# Patient Record
Sex: MALE | Race: WHITE | NOT HISPANIC OR LATINO
[De-identification: names, ages, dates, MRNs, and addresses within clinical notes are randomized per-mention and may not be internally consistent; named-entity substitution may affect disease eponyms.]

---
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
## 2021-01-27 PROBLEM — R73.09 ELEVATED HEMOGLOBIN A1C: Status: RESOLVED | Noted: 2018-02-22 | Resolved: 2021-01-27

## 2021-01-27 PROBLEM — E84.8: Status: ACTIVE | Noted: 2019-02-13

## 2021-01-27 PROBLEM — Z86.39 HISTORY OF HYPERGLYCEMIA: Status: RESOLVED | Noted: 2018-02-22 | Resolved: 2021-01-27

## 2021-01-27 NOTE — END OF VISIT
[>50% of Time Spent on Counseling and Coordination of Care for  ___] : Greater than 50% of the encounter time was spent on counseling and coordination of care for [unfilled] [Time Spent: ___ minutes] : I have spent [unfilled] minutes of face to face time with the patient [FreeTextEntry2] : IShara have acted as a scribe and documented the HPI information for .  The HPI information completed by the scribe is an accurate record of both my words and actions.

## 2021-01-27 NOTE — REASON FOR VISIT
[Routine Follow-Up] : a routine follow-up visit for [Patient] : patient [FreeTextEntry3] : 4th quarter visit

## 2021-01-27 NOTE — PHYSICAL EXAM
[Well Nourished] : well nourished [Well Developed] : well developed [Well Groomed] : well groomed [Alert] : ~L alert [Active] : active [Normal Breathing Pattern] : normal breathing pattern [No Respiratory Distress] : no respiratory distress [No Allergic Shiners] : no allergic shiners [No Drainage] : no drainage [No Conjunctivitis] : no conjunctivitis [Tympanic Membranes Clear] : tympanic membranes were clear [Nasal Mucosa Non-Edematous] : nasal mucosa non-edematous [No Nasal Drainage] : no nasal drainage [No Sinus Tenderness] : no sinus tenderness [No Oral Pallor] : no oral pallor [No Oral Cyanosis] : no oral cyanosis [Non-Erythematous] : non-erythematous [No Exudates] : no exudates [No Postnasal Drip] : no postnasal drip [Tonsil Size ___] : tonsil size [unfilled] [No Tonsillar Enlargement] : no tonsillar enlargement [No Stridor] : no stridor [Absence Of Retractions] : absence of retractions [Symmetric] : symmetric [No Acc Muscle Use] : no accessory muscle use [Good aeration to bases] : good aeration to bases [Equal Breath Sounds] : equal breath sounds bilaterally [No Crackles] : no crackles [No Rhonchi] : no rhonchi [No Wheezing] : no wheezing [Normal Sinus Rhythm] : normal sinus rhythm [No Heart Murmur] : no heart murmur [Soft, Non-Tender] : soft, non-tender [No Hepatosplenomegaly] : no hepatosplenomegaly [Non Distended] : was not ~L distended [Abdomen Mass (___ Cm)] : no abdominal mass palpated [Full ROM] : full range of motion [No Cyanosis] : no cyanosis [No Petechiae] : no petechiae [No Kyphoscoliosis] : no kyphoscoliosis [No Contractures] : no contractures [Abnormal Walk] : normal gait [Alert and  Oriented] : alert and oriented [Normal Muscle Tone And Reflexes] : normal muscle tone and reflexes [No Birth Marks] : no birth marks [No Rashes] : no rashes [No Skin Lesions] : no skin lesions [FreeTextEntry1] : healthy appearing- dyed hair, tattoos  [de-identified] : early clubbing  [de-identified] :   tattoo on left flank, both thighs

## 2021-01-27 NOTE — HISTORY OF PRESENT ILLNESS
[] : vest daily [___] : vest is used for [unfilled] minutes [Poor] : poor [Normal] : normal [FreeTextEntry1] : 12/18/[birth year] 19 y/o male with CF here for routine f/u in office for 4th quarter visit, last seen 8/17/20 prior to going up to [employer] for Fall semester.\par Interval: unremarkable: started Trikafta this January 2020.\par \par Endo: Started insulin via adult Endo in May 2020, Lantus 20 units qhs and Humalog 13 units with meals. Excellent weight gain. Blood sugars range .  Never in the 200s as they were prior to starting insulin. 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 \par \par Pulm:  Ventolin with spacer/Pulmozyme using vest while home from college/ On alternate month AC podhaler on/off this is an off month. Denies cough even with treatments. PFTs are unavailable today.  Importance of airway clearance stressed with patient. Given Aerobika at last visit but not using at college. \par  Previously pt. was not sterilizing respiratory equipment at school- only cleaning. Despite lack of adherence to Vest, I encouraged him to bring to school in the event he becomes sick. He states he will use Aerobika over the Vest for now. \par He is home now and reports all of his respiratory equipment are in good working order. \par \par Endo:  Adult Endo in July 2019: for elevated 2 hour value on OGTT. Removed soda and concentrated sugar from diet. Liberalized his diet- no longer eliminating all carbohydrates. CGM recommended but patient deferred, plan was to have him test BS instead which he states he doesn't do and prefers to just watch what he eats as he was checking randomly and values were all less than 150.  Will follow up with endo q 6 months. \par \par GI: Denies abd pain  at this time. No issues with constipation or need for Miralax.  Stools are 1-2 x/day, Appetite is good- states he forces himself to eat even when he not hungry; weight today 148 pounds - he gained weight this visit. Continues with enzymes: Creon 24,000, 4 with meals, 3 with snacks. \par \par \par  [de-identified] : reports adherence of vest use  5/7 days week [de-identified] : Eusebio Paez [de-identified] : three meals, 3 snacks, no supplements

## 2021-01-27 NOTE — REVIEW OF SYSTEMS
[NI] : Allergic [Nl] : Endocrine [Wgt Gain (___ Kg)] : recent [unfilled] kg weight gain [___Stools per day] : [unfilled] stools per day [Immunizations are up to date] : Immunizations are up to date [Influenza Vaccine this Past Year] : Influenza vaccine this past year [Wgt Loss (___ Kg)] : no recent weight loss [Cough] : no cough [Abdominal Pain] : no abdominal pain [Oily Stool] : no oily stool [FreeTextEntry2] : very large appetite, eats a lot and often; sometimes forcing himself to eat [de-identified] : no polyuria, polydipsia  [FreeTextEntry1] : Flu vaccine for 2020-21 given at school.

## 2021-07-14 ENCOUNTER — RX RENEWAL (OUTPATIENT)
Age: 21
End: 2021-07-14

## 2021-07-23 ENCOUNTER — APPOINTMENT (OUTPATIENT)
Dept: ENDOCRINOLOGY | Facility: CLINIC | Age: 21
End: 2021-07-23
Payer: COMMERCIAL

## 2021-07-23 DIAGNOSIS — E84.9 CYSTIC FIBROSIS, UNSPECIFIED: ICD-10-CM

## 2021-07-23 DIAGNOSIS — E08.9 CYSTIC FIBROSIS, UNSPECIFIED: ICD-10-CM

## 2021-07-23 PROCEDURE — 99213 OFFICE O/P EST LOW 20 MIN: CPT | Mod: 95

## 2021-07-23 RX ORDER — PEN NEEDLE, DIABETIC 29 G X1/2"
32G X 4 MM NEEDLE, DISPOSABLE MISCELLANEOUS
Qty: 4 | Refills: 1 | Status: ACTIVE | COMMUNITY
Start: 2020-05-13 | End: 1900-01-01

## 2021-07-23 NOTE — PHYSICAL EXAM
[Well Nourished] : well nourished [No Respiratory Distress] : no respiratory distress [No Accessory Muscle Use] : no accessory muscle use [Normal Rate and Effort] : normal respiratory rate and effort [Oriented x3] : oriented to person, place, and time [Normal Affect] : the affect was normal [Normal Insight/Judgement] : insight and judgment were intact [de-identified] : not able to access [de-identified] : Unable to assess [de-identified] : Unable to assess [de-identified] : Unable to assess

## 2021-07-23 NOTE — HISTORY OF PRESENT ILLNESS
[FreeTextEntry1] : Mr. 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  \par \par He was initially seen there in Feb 2018 for evaluation due to concern for new onset diabetes. Michael follows with Dr. Bailey, who performs screening OGTTs to evaluate for CF related diabetes. Previously Michael had an elevated fasting glucose on 7/15/15 (125 mg/dL), but 2 hour glucose was normal at 109 mg/dL. Repeat OGT on 6/9/17 was normal. Michael's most recent testing was performed on 2/20/18 and results were concerning for diabetes (fasting 129 mg/dL, 2 hour 265 mg/dL). A1c was 6.2 %. He was then referred to yair arevalo for evaluation.  \par \par He changed his diet significantly, he cut out all concentrated sweets.  \par \par He is not interested in meeting with the nutritionist. He is not interested in CGMs as well currently.  \par \par Patient was started on insulin therapy around May 13, 2020.\par \par Patient is currently taking Basaglar 20 units at bedtime, Humalog 13 units 3 times daily with meals.  Reports that he had been adherent with his insulin regimen.  Reports that he has been monitoring glucose twice daily.  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  In terms of diet, usually chicken rice, quesadillas, burritos.  He cooks at home has his own kitchen, lives in an apartment.\par \par Patient is most likely relocating out of [place].\par \par \par

## 2021-07-23 NOTE — ASSESSMENT
[FreeTextEntry1] : Mr. CARINA HARRIS is a 21 year old male with cystic fibrosis here for evaluation of elevated 2 hour glucose tests. \par \par 1 CFRD\par Discussed that the primary abnormality predisposing to CFRD is slowly progressive insulin deficiency due to fibrosis and atrophy of pancreatic tissue. For now, no indication to start insulin therapy as she only has mild dysglycemia and there's no symptoms of hyperglycemia or CF exacerbation or poor nutritional status.  Patient is currently on basal bolus insulin regimen.  He is monitoring glucose twice daily although not suboptimally.  He reports his weight has been stable.  He is being treated with Trikafta, denies any major improvement in terms of pulmonary functions.  \par Recommend to continue with Basaglar 20 units at bedtime\par Recommend to continue with Humalog 13 units 3 times daily with meals.\par Patient was recommended to check glucose more frequently.\par He is not able to return to [place] until sometimes next year.\par I discussed with patient's that I need to obtain some laboratory results including A1c, microalbumin/creatinine ratio and thyroid function test.\par Will e-mail script to his e-mail\par He is going to see a new PMD at [place] next week and willl ask them to draw those labs. \par Will let me know regards to follow up plans. \par Asked patient to FU in 6 months.

## 2021-07-23 NOTE — REASON FOR VISIT
[Home] : at home, [unfilled] , at the time of the visit. [Medical Office: ([facility])___] : at the medical office located in  [Verbal consent obtained from patient] : the patient, [unfilled] [Follow - Up] : a follow-up visit

## 2021-07-29 RX ORDER — ELEXACAFTOR, TEZACAFTOR, AND IVACAFTOR 100-50-75
100-50-75 & 150 KIT ORAL
Qty: 1 | Refills: 2 | Status: ACTIVE | COMMUNITY
Start: 2019-12-18 | End: 1900-01-01

## 2021-08-12 ENCOUNTER — NON-APPOINTMENT (OUTPATIENT)
Age: 21
End: 2021-08-12

## 2022-07-26 ENCOUNTER — RX RENEWAL (OUTPATIENT)
Age: 22
End: 2022-07-26

## 2022-10-13 NOTE — ED PEDIATRIC NURSE NOTE - NSFALLRSKASSESSDT_ED_ALL_ED
Nelida Vincent)  Internal Medicine  [address] [place] [address], [address]  [place] [address]  Phone: [phone]  Fax: [phone]  Established Patient  Scheduled Appointment: 10/18/2022 12:30 PM    Deidre Taylor  Emergency Medicine  Phone: [phone]  Fax: [phone]  Established Patient  Follow Up Time:   
27-Jan-2019 20:54

## 2022-11-01 ENCOUNTER — RX RENEWAL (OUTPATIENT)
Age: 22
End: 2022-11-01

## 2022-11-01 RX ORDER — INSULIN GLARGINE 100 [IU]/ML
100 INJECTION, SOLUTION SUBCUTANEOUS
Qty: 30 | Refills: 0 | Status: ACTIVE | COMMUNITY
Start: 2020-05-19 | End: 1900-01-01

## 2023-11-07 ENCOUNTER — RX RENEWAL (OUTPATIENT)
Age: 23
End: 2023-11-07

## 2023-11-15 NOTE — ED PEDIATRIC NURSE NOTE - PMH
cough x1 month. -fever, -nausea/vomiting. pt stated chest pain and SOB from spinal fusion pain. 10/10 pain from back radiating to leg. -retractions.   hx asthma, IUTD
Cystic fibrosis

## 2024-08-07 NOTE — ED PEDIATRIC TRIAGE NOTE - SOURCE OF INFORMATION
Patients spouse called , [address] medical needs additional information for patient faxed over.    Needs: recent office note and catheter supplies order.    Fax#[phone]    Patient CB# [phone]   Mother/EMS
